# Patient Record
(demographics unavailable — no encounter records)

---

## 2024-12-30 NOTE — REASON FOR VISIT
02/24/18 0356   Type of Assessment   Assessment Yes   Patient History   Pulmonary Diagnosis copd   Home O2 Use Prior To Admission? No   Home Treatments/Frequency Yes   MDI 1/Frequency albuterol bid    COPD Risk Screening   Do you have a history of COPD? Yes   Do you have a Pulmonologist? No   COPD Population Screener   During the past 4 weeks, how much did you feel short of breath? 1   Do you ever cough up any mucus or phlegm? 0   In the past 12 months, you do less than you used to because of your breathing problems 1   Have you smoked at least 100 cigarettes in your entire life? 2   How old are you? 2   COPD Screening Score 6   COPD Coordinator Recommended Yes   Smoking History   Have you Ever Smoked Yes   Have you Smoked in the Last 12 Mos No   Confirm Quit Date 02/24/89   Level Of Consciousness   Level of Consciousness Alert   Respiratory WDL   Respiratory (WDL) X   Chest Exam   Work Of Breathing / Effort Mild   Respiration 17   Pulse 77   Heart Rate (Monitored) 78   Breath Sounds   Pre/Post Intervention Pre Intervention Assessment   RUL Breath Sounds Diminished   RML Breath Sounds Diminished   RLL Breath Sounds Diminished   KITTY Breath Sounds Diminished   LLL Breath Sounds Diminished   Protocol Pathways   Protocol Pathways Yes   Bronchodilator Protocol   Med Order With RCP Yes - Initial Order by MD for Therapy - Follow Order for 24 Hours, Reassess Patient   Obstructive Ventilatory Defect or Pulmonary Disease without Obvious Obstruction History / Diagnosis;Strong Subjective / Objective Improvement   Breath Sounds Criteria 1    Benefit Criteria 1    Pulse Criteria 0    Respiratory Rate Criteria 1    S.O.B. Criteria 0    Criteria Total 3   Point Values 0-3 - PRN   Bronchodilator Goals/Outcome Diminished Wheezing and Volume of Air Movement Increased;Patient at Stable Baseline   O2 Protocol   O2 (LPM) 0      [Follow-Up: _____] : a [unfilled] follow-up visit

## 2024-12-31 NOTE — FAMILY HISTORY
[TextEntry] : Denies family history of breast, ovarian cancer, colon cancer, pancreatic cancer, prostate cancer, melanoma

## 2024-12-31 NOTE — ASSESSMENT
[FreeTextEntry1] : 42 year old female, left breast pain, left breast nodule, stable on imaging, dense breast tissue   #left breast pain  Reviewed patient's nature of left breast pain, discussed options for physical therapy for further evaluation given the pain presents musculoskeletal and neuropathic in nature.  The patient will be going for massage, and will notify me regarding if the massage helps to alleviate her symptoms.  Reviewed with the patient the plan to proceed with a bilateral screening mammogram & US in June 2025 and follow-up at that time.  #left breast nodule  Patient with left breast nodule 8:00 1cmFN, 0.4cm, stable.   #dense breast tissue Patient with extremely dense breast on mammographic evaluation with the recommendation for screening US as supplemental given density of breasts

## 2024-12-31 NOTE — PAST MEDICAL HISTORY
[Menstruating] : The patient is menstruating [Menarche Age ____] : age at menarche was [unfilled] [Definite ___ (Date)] : the last menstrual period was [unfilled] [Regular Cycle Intervals] : have been regular [Total Preg ___] : G[unfilled] [Live Births ___] : P[unfilled]  [Full Term ___] : Full Term: [unfilled] [History of Hormone Replacement Treatment] : has no history of hormone replacement treatment [FreeTextEntry5] :  x2 [FreeTextEntry6] : NONE [FreeTextEntry8] : BREAST FED x2 CHILDREN 6 YEARS AGO FOR ABOUT @ 2 MONTHS [FreeTextEntry7] : PRIOR IN THE PAST (Non-Recent)

## 2024-12-31 NOTE — PAST MEDICAL HISTORY
[Menstruating] : The patient is menstruating [Menarche Age ____] : age at menarche was [unfilled] [Definite ___ (Date)] : the last menstrual period was [unfilled] [Regular Cycle Intervals] : have been regular [Total Preg ___] : G[unfilled] [Live Births ___] : P[unfilled]  [Full Term ___] : Full Term: [unfilled] [History of Hormone Replacement Treatment] : has no history of hormone replacement treatment [FreeTextEntry5] :  x2 [FreeTextEntry6] : NONE [FreeTextEntry7] : PRIOR IN THE PAST (Non-Recent) [FreeTextEntry8] : BREAST FED x2 CHILDREN 6 YEARS AGO FOR ABOUT @ 2 MONTHS
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
## 2024-12-31 NOTE — DATA REVIEWED
[FreeTextEntry1] : 6/7/24 (R) b/l dx mammogram & US: extremely dense, b/l cysts. Left breast retroareolar region corresponding to area of pain, no suspicious sonographic findings. Left 8:00 1cmFN 0.4cm oval hypoechoic mass stable to slightly decreased in size compared to April 2022 US. Benign BIRADS 2